# Patient Record
Sex: FEMALE | Race: WHITE | Employment: UNEMPLOYED | URBAN - METROPOLITAN AREA
[De-identification: names, ages, dates, MRNs, and addresses within clinical notes are randomized per-mention and may not be internally consistent; named-entity substitution may affect disease eponyms.]

---

## 2021-03-09 ENCOUNTER — HOSPITAL ENCOUNTER (EMERGENCY)
Age: 41
Discharge: HOME | End: 2021-03-09
Attending: EMERGENCY MEDICINE
Payer: COMMERCIAL

## 2021-03-09 VITALS
WEIGHT: 256.62 LBS | SYSTOLIC BLOOD PRESSURE: 132 MMHG | HEART RATE: 74 BPM | OXYGEN SATURATION: 100 % | RESPIRATION RATE: 16 BRPM | TEMPERATURE: 98.5 F | DIASTOLIC BLOOD PRESSURE: 72 MMHG

## 2021-03-09 DIAGNOSIS — G51.0 BELL'S PALSY: Primary | ICD-10-CM

## 2021-03-09 LAB
GLUCOSE BLD-MCNC: 179 MG/DL (ref 70–99)
PERFORMED ON: ABNORMAL

## 2021-03-09 PROCEDURE — 99283 EMERGENCY DEPT VISIT LOW MDM: CPT

## 2021-03-09 RX ORDER — PREDNISONE 20 MG/1
60 TABLET ORAL DAILY
Qty: 21 TABLET | Refills: 0 | Status: SHIPPED | OUTPATIENT
Start: 2021-03-09 | End: 2021-03-09 | Stop reason: SDUPTHER

## 2021-03-09 RX ORDER — VALACYCLOVIR HYDROCHLORIDE 1 G/1
1000 TABLET, FILM COATED ORAL 3 TIMES DAILY
Qty: 21 TABLET | Refills: 0 | Status: SHIPPED | OUTPATIENT
Start: 2021-03-09 | End: 2021-03-16

## 2021-03-09 RX ORDER — PREDNISONE 20 MG/1
60 TABLET ORAL DAILY
Qty: 21 TABLET | Refills: 0 | Status: SHIPPED | OUTPATIENT
Start: 2021-03-09 | End: 2021-03-16

## 2021-03-09 SDOH — HEALTH STABILITY: MENTAL HEALTH: HOW OFTEN DO YOU HAVE A DRINK CONTAINING ALCOHOL?: NEVER

## 2021-03-09 NOTE — ED PROVIDER NOTES
[address]       Pt Name: Ann-Marie Koch  MRN: [id number]  [place] 1980  Date of evaluation: 3/9/2021  Provider: Fonda Goldberg, PA    This patient was also seen and evaluated by the attending physician Jenna Bear, [address] [place] [address]       Chief Complaint   Patient presents with    Numbness     pt c/o right sided facial numbness started on 3/7       HISTORY OF PRESENT ILLNESS  (Location/Symptom, Timing/Onset, Context/Setting, Quality, Duration, Modifying Factors, Severity.)   Ann-Marie Koch is a 38 y.o. female who presents to the emergency department with complaint of numbness in the right side of the face and mild facial droop. Patient says her symptoms began the day before yesterday with some numbness in the right side of her tongue, and she was seen at [facility] yesterday where she got a work-up including CT scans for possible stroke. She says they found an aneurysm but nothing else that she is aware of, and she was discharged with prescriptions for Plavix and aspirin. She comes today because she says the numbness is worsening, and spreading through the right side of her face toward the ear, and she also has some loss of sense of taste. She says that today he began to notice some mild drooping on the right side of her mouth. Denies any prior history of symptoms like this or any history of stroke. Denies headache, cold symptoms, ear pain, hearing loss, neck pain or stiffness. Denies fever or feelings of general illness. Nursing Notes were reviewed and I agree. REVIEW OF SYSTEMS    (2-9 systems for level 4, 10 or more for level 5)     Constitutional:  Negative for fever, chills. Respiratory:  Negative for cough, shortness of breath. Cardiovascular:  Negative for chest pain, palpitations. Gastrointestinal:  Negative for nausea, vomiting, abdominal pain.    Genitourinary:  Negative for dysuria, hematuria, flank pain, and pelvic pain. Musculoskeletal:  Negative for myalgias, arthralgias, neck pain and neck stiffness. Neurological: Positive for numbness in the right-sided tongue and facial area, with some mild facial droop. Negative for dizziness, focal weakness, and headaches. Except as noted above the remainder of the review of systems was reviewed and negative. PAST MEDICAL HISTORY   History reviewed. No pertinent past medical history. SURGICAL HISTORY     History reviewed. No pertinent surgical history. CURRENT MEDICATIONS       Discharge Medication List as of 3/9/2021 12:51 PM          ALLERGIES     Patient has no known allergies. FAMILY HISTORY     History reviewed. No pertinent family history. No family status information on file. SOCIAL HISTORY      reports that she has never smoked. She has never used smokeless tobacco. She reports that she does not drink alcohol or use drugs. PHYSICAL EXAM    (up to 7 for level 4, 8 or more for level 5)     ED Triage Vitals   BP Temp Temp Source Pulse Resp SpO2 Height Weight   03/09/21 1018 03/09/21 1030 03/09/21 1030 03/09/21 1018 03/09/21 1018 03/09/21 1018 -- 03/09/21 1018   (!) 142/88 98.5 °F (36.9 °C) Oral 77 17 99 %  256 lb 9.9 oz (116.4 kg)       Constitutional:  Appearing well-developed and well-nourished. No distress. HENT:  Normocephalic and atraumatic. Cardiovascular:  Normal rate, regular rhythm, normal heart sounds and intact distal pulses. Pulmonary/Chest:  Effort normal and breath sounds normal. No respiratory distress. Musculoskeletal:  Normal range of motion. No edema exhibited. Neurological:  Alert and oriented to person, place, and time. PERRLA. EOM normal bilaterally. Very slight drooping noted at the right side of the mouth and in the right eyelid, with diminished muscle strength with smiling and eyebrow raising on the right side only. Sensation grossly intact. Finger to nose testing intact.  Negative pronator drift. 5/5 strength bilaterally in upper extremities with resisted shoulder abduction and flexion. Negative Romberg. Normal gait. Skin:  Skin is warm and dry. Not diaphoretic. Psychiatric:  Normal mood, affect, behavior, judgment and thought content. DIAGNOSTIC RESULTS     RADIOLOGY:   Non-plain film images such as CT, Ultrasound and MRI are read by the radiologist. Plain radiographic images are visualized and preliminarily interpreted by HARPREET Espinal with the below findings:    None. Interpretation per the Radiologist below, if available at the time of this note:    No orders to display       LABS:  Labs Reviewed   POCT GLUCOSE - Abnormal; Notable for the following components:       Result Value    POC Glucose 179 (*)     All other components within normal limits    Narrative:     Performed at:  [address] [address]   Phone [phone]       All other labs were within normal range or not returned as of this dictation. EMERGENCY DEPARTMENT COURSE and DIFFERENTIAL DIAGNOSIS/MDM:   Vitals:    Vitals:    03/09/21 1147 03/09/21 1202 03/09/21 1217 03/09/21 1232   BP: 109/68 (!) 134/96 130/70 132/72   Pulse: 85 69 65 74   Resp: 17 16 16 16   Temp:       TempSrc:       SpO2: 100% 100% 100% 100%   Weight:           The patient's condition in the ED was good, the patient was afebrile and nontoxic in appearance, and the patient's physical exam was strongly suggestive of Bell's palsy. Symptoms have been going on now for 2 days. No stroke alert indicated today. Review of records do show that work-up yesterday was negative for evidence of stroke, but did show a small cerebral aneurysm. I consulted the NP on-call for neurology, who said the patient could be seen for follow-up. No indication for repeat imaging or laboratory work-up today. There was no indication for hospitalization.   Patient will be discharged with prescriptions for a course of prednisone and valacyclovir, and she was given a follow-up for neurology at her previous visit. She will be advised to arrange for that follow-up but will be given a follow-up with our neurology service in case needed. She reports that she had already contacted neurosurgery subsequent to her ED visit yesterday, and they advised no further care at this time. The patient verbalized understanding and agreement with this plan of care. The patient was advised to return to the emergency department if symptoms should significantly worsen or if new and concerning symptoms should appear. I estimate there is LOW risk for SUBARACHNOID HEMORRHAGE, MENINGITIS, INTRACRANIAL HEMORRHAGE, SUBDURAL OR EPIDURAL HEMATOMA, OR STROKE, thus I consider the discharge disposition reasonable. PROCEDURES:  None    FINAL IMPRESSION      1.  Bell's palsy          DISPOSITION/PLAN   DISPOSITION Decision To Discharge 03/09/2021 12:42:03 PM      PATIENT REFERRED TO:  your neurologist    Schedule an appointment as soon as possible for a visit   For follow-up care    Jodeen Closs, MD  [address] [place] [address]  [phone]    Call   if needed, for neurology follow-up care      DISCHARGE MEDICATIONS:  Discharge Medication List as of 3/9/2021 12:51 PM      START taking these medications    Details   valACYclovir (VALTREX) 1 g tablet Take 1 tablet by mouth 3 times daily for 7 days, Disp-21 tablet, R-0Print             (Please note that portions of this note were completed with a voice recognition program.  Efforts were made to edit the dictations but occasionally words are mis-transcribed.)    Colleen Davis, [id number] [place] [address]  03/09/21 9749

## 2021-03-09 NOTE — ED PROVIDER NOTES
Attending Supervisory Note/Shared Visit   I have personally performed a face to face diagnostic evaluation on this patient. I have reviewed the mid-levels findings and agree. History and Exam by me shows a 74-year-old female who presents for evaluation of right facial numbness. Patient states that she was seen at [facility] yesterday, and at that time only had numbness of the right side of her tongue. She states that today the symptoms have progressed and she now has numbness over her cheek, as well as decreased taste on the right side of her tongue, and some mild weakness of the right side of her face. She reports that her right eye has been intermittently watering as well. She is had some mild fluctuating right-sided headache today which she reports is mild, 2 out of 10, not severe at onset. On exam she is well-appearing, afebrile, with normal vital signs. Noted to have a peripheral 7th nerve palsy, very subtle right facial droop, decreased sensation in the V2 V3 dermatomes, decreased movement of the right eyebrow compared to the left, and inability to maintain ocular closure on the right with resistance. Otherwise, completely normal neurologic exam.  Patient did reports that she had CT imaging done at [facility] yesterday, and they told her that she had an aneurysm. CT results from yesterday were reviewed, CT noncontrast of the head was negative. 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 Stroke consult was reviewed from yesterday as well, felt that the patient can follow-up for outpatient MRI, and in terms of the aneurysm, repeat CTA with neuro interventional clinic in 3 to 6 months to ensure stability. Per ER record, the ER physician at [place] [address] was had discussed with Dr. Savage Wetzel of neurosurgery who would follow-up with the patient regarding repeat CTA study.   Given full work-up obtained yesterday, and now exam more consistent with a peripheral 7th nerve palsy, did not feel that repeat laboratory studies or imaging were indicated. Repeat did discuss with neurology, who will follow the patient for further management of Bell's palsy. Neurology agreed that no additional work-up is indicated at this time. Patient will be treated with prednisone and valacyclovir. Recommended to return with any new or worsening symptoms including any other focal numbness or weakness. Patient is understanding, agreeable plan of care, discharged in stable condition.       Tiff Vieyra MD  Attending Emergency Physician        Tiff Vieyra MD  03/09/21 7153

## 2021-03-09 NOTE — ED TRIAGE NOTES
Pt arrived to the ED via private vehicle from home. Pt c/o left sided facial numbness that started on 3/7. Pt states she was told by her dr that if it got worse to come to the ED.  Pt denies pain

## 2021-03-09 NOTE — CONSULTS
Called by [facility] ED PA. In brief, the patient is a 39 yo female seen at [facility] yesterday for R tongue and face weakness. CT head was w/out any acute findings. CTA head/neck w/ small L ICA aneurysm to be followed outpatient by Neurosurgery.  stroke team was involved and started the patient on a short course of DAPT and felt that MRI brain could be pursued outpatient. The patient returned to [facility] ED today for progressive symptoms. She now had numbness on cheek and worsening R facial weakness. Exam by ED personnel was reportedly consistent w/ Bell's palsy which they planned on treating. The call was to arrange follow up w/ Neurology, which is fine. She can follow up w/ Dr. Mark Hunter in the office. Please call back w/ any additional questions or concerns. Thank you.       Florence Dos Santos NP  [address] [place] [address] [address] Neurology

## 2021-11-05 ENCOUNTER — HOSPITAL ENCOUNTER (EMERGENCY)
Age: 41
Discharge: HOME | End: 2021-11-05
Attending: EMERGENCY MEDICINE
Payer: COMMERCIAL

## 2021-11-05 VITALS
SYSTOLIC BLOOD PRESSURE: 133 MMHG | TEMPERATURE: 98 F | DIASTOLIC BLOOD PRESSURE: 76 MMHG | RESPIRATION RATE: 16 BRPM | WEIGHT: 256 LBS | OXYGEN SATURATION: 100 % | HEART RATE: 57 BPM

## 2021-11-05 DIAGNOSIS — Z86.69 HISTORY OF BELL'S PALSY: ICD-10-CM

## 2021-11-05 DIAGNOSIS — R29.810 FACIAL DROOP: Primary | ICD-10-CM

## 2021-11-05 PROCEDURE — 99283 EMERGENCY DEPT VISIT LOW MDM: CPT

## 2021-11-05 RX ORDER — MONTELUKAST SODIUM 10 MG/1
TABLET ORAL
COMMUNITY
Start: 2021-10-14

## 2021-11-05 RX ORDER — CETIRIZINE HYDROCHLORIDE 10 MG/1
TABLET ORAL
COMMUNITY
Start: 2021-10-13

## 2021-11-05 RX ORDER — HYDROXYZINE HYDROCHLORIDE 10 MG/1
TABLET, FILM COATED ORAL
COMMUNITY
Start: 2021-10-12

## 2021-11-05 RX ORDER — FLUOXETINE HYDROCHLORIDE 20 MG/1
CAPSULE ORAL
COMMUNITY
Start: 2021-11-01

## 2021-11-05 ASSESSMENT — PAIN DESCRIPTION - LOCATION: LOCATION: HEAD

## 2021-11-05 ASSESSMENT — PAIN DESCRIPTION - FREQUENCY: FREQUENCY: INTERMITTENT

## 2021-11-05 ASSESSMENT — ENCOUNTER SYMPTOMS
EYE DISCHARGE: 0
EYE REDNESS: 1
PHOTOPHOBIA: 0
RESPIRATORY NEGATIVE: 1

## 2021-11-05 ASSESSMENT — PAIN DESCRIPTION - PAIN TYPE: TYPE: ACUTE PAIN

## 2021-11-05 ASSESSMENT — PAIN DESCRIPTION - DESCRIPTORS: DESCRIPTORS: ACHING

## 2021-11-05 ASSESSMENT — PAIN SCALES - GENERAL: PAINLEVEL_OUTOF10: 2

## 2021-11-05 NOTE — ED PROVIDER NOTES
[address] [facility]      Pt Name: Zeeshan Gaitan  MRN: [id number]  [place] 1980  Date of evaluation: 11/5/2021  Provider: Keke Lyle MD    CHIEF COMPLAINT     When I woke up this morning I felt like the right side of my chin had a wrinkle in it in my right I was bigger and red and I was worried about a stroke though it felt sort of like when I had Bell's palsy. HISTORY OF PRESENT ILLNESS  (Location/Symptom, Timing/Onset,Context/Setting, Quality, Duration, Modifying Factors, Severity). Note limiting factors. Chief Complaint   Patient presents with    Facial Droop     Pt arrrives for eval of right side facial droop last known well 2100 yesterday, pt sts hx of bells palsy      Zeeshan Gaitan is a 42 y.o. female who presents to the emergency department secondary to concern for facial droop as noted above. On arrival here she no longer has a facial droop. She denies any other symptoms including numbness, tingling, paresthesias. She reports she had Bell's palsy previously and the symptoms took a while to develop. She states when she went to bed last night feeling normal this week she has been told by her boyfriend and daughter her right eye drooped and had an episode where it moved differently from the other eye. She has had no other trauma, no falls. She reports a history of an aneurysm behind her left eye which she follows with neurology for which has been stable. She states this week she has had a fever on and off, otherwise has felt fine. She was tested for Covid last week and her daughter was tested for Covid a couple of days ago both were negative. She has not been vaccinated against Covid. She denies any nausea vomiting any chest pain, belly pain. She does states she has a mild headache on the right side of her head and the back. No vision difficulties, no speech difficulties.     No past medical history noted below, she reports history of [place] Palsy and cerebral aneurysm, nonruptured. Denies smoking. Aside from what is stated above denies any other symptoms or modifying factors. Nursing Notes reviewed. REVIEW OF SYSTEMS  (2-9 systems for level 4, 10 or more for level 5)   Review of Systems   Constitutional: Negative for activity change. HENT: Negative for ear pain. Eyes: Positive for redness (Right eye, earlier, resolved). Negative for photophobia, discharge and visual disturbance. Respiratory: Negative. Cardiovascular: Negative. Genitourinary: Negative. Musculoskeletal: Negative for gait problem. Skin: Negative. Neurological: Positive for facial asymmetry (Earlier, resolved). Negative for syncope and weakness. PAST MEDICAL HISTORY     Past Medical History:   Diagnosis Date    Bell's palsy        SURGICALHISTORY       Past Surgical History:   Procedure Laterality Date    APPENDECTOMY      TUBAL LIGATION       CURRENT MEDICATIONS       Previous Medications    CETIRIZINE (ZYRTEC) 10 MG TABLET        FLUOXETINE (PROZAC) 20 MG CAPSULE        HYDROXYZINE (ATARAX) 10 MG TABLET        MONTELUKAST (SINGULAIR) 10 MG TABLET          ALLERGIES     Patient has no known allergies. FAMILY HISTORY     History reviewed. No pertinent family history.   SOCIAL HISTORY       Social History     Socioeconomic History    Marital status:      Spouse name: None    Number of children: None    Years of education: None    Highest education level: None   Occupational History    None   Tobacco Use    Smoking status: Never Smoker    Smokeless tobacco: Never Used   Vaping Use    Vaping Use: Never used   Substance and Sexual Activity    Alcohol use: Not Currently    Drug use: Never    Sexual activity: Not Currently   Other Topics Concern    None   Social History Narrative    None     Social Determinants of Health     Financial Resource Strain:     Difficulty of Paying Living Expenses:    Food Insecurity:  Worried About [facility] in the Last Year:    [address] in the Last Year:    Transportation Needs:     Lack of Transportation (Medical):  Lack of Transportation (Non-Medical):    Physical Activity:     Days of Exercise per Week:     Minutes of Exercise per Session:    Stress:     Feeling of Stress :    Social Connections:     Frequency of Communication with Friends and Family:     Frequency of Social Gatherings with Friends and Family:     Attends Buddhist Services:     Active Member of Clubs or Organizations:     Attends Club or Organization Meetings:     Marital Status:    Intimate Partner Violence:     Fear of Current or Ex-Partner:     Emotionally Abused:     Physically Abused:     Sexually Abused:      SCREENINGS   NIH Stroke Scale  Interval: Baseline  Level of Consciousness (1a. ): Alert  LOC Questions (1b. ): Answers both correctly  LOC Commands (1c. ): Performs both tasks correctly  Best Gaze (2. ): Normal  Visual (3. ): No visual loss  Facial Palsy (4. ): Normal symmetrical movement  Motor Arm, Left (5a. ): No drift  Motor Arm, Right (5b. ): No drift  Motor Leg, Left (6a. ): No drift  Motor Leg, Right (6b. ): No drift  Limb Ataxia (7. ): Absent  Sensory (8. ): Normal  Best Language (9. ): No aphasia  Dysarthria (10. ): Normal  Extinction and Inattention (11): No abnormality  Total: 0     PHYSICAL EXAM  (up to 7 for level 4, 8 or more for level 5)   INITIAL VITALS: BP: (!) 146/85, Temp: 98 °F (36.7 °C), Pulse: 59, Resp: 16, SpO2: 98 %   Physical Exam  Vitals reviewed. Constitutional:       Appearance: She is not toxic-appearing or diaphoretic. HENT:      Head: Normocephalic and atraumatic. Right Ear: External ear normal.      Left Ear: External ear normal.   Eyes:      General: No scleral icterus. Conjunctiva/sclera: Conjunctivae normal.   Neck:      Trachea: No tracheal deviation. Cardiovascular:      Rate and Rhythm: Normal rate.       Pulses: Normal pulses. Pulmonary:      Effort: Pulmonary effort is normal. No respiratory distress. Abdominal:      Tenderness: There is no abdominal tenderness. There is no guarding or rebound. Musculoskeletal:      Cervical back: Normal range of motion. No rigidity. Skin:     General: Skin is dry. Capillary Refill: Capillary refill takes less than 2 seconds. Neurological:      General: No focal deficit present. Mental Status: She is alert and oriented to person, place, and time. Cranial Nerves: No cranial nerve deficit. Sensory: No sensory deficit. Motor: No weakness. Coordination: Coordination normal. Finger-Nose-Finger Test normal.      Gait: Gait normal.      Comments: Face is grossly symmetric. She is able to puff out her lips without any change. Tongue is midline, uvula is midline. Eyes closed symmetrically. Forehead wrinkle symmetrically. Strength and sensation intact bilateral upper and lower extremities. DIAGNOSTIC RESULTS     RADIOLOGY:   Interpretation per Radiologist below, if available at the time of this note:  No orders to display     LABS:  Labs Reviewed - No data to display    [address] [place] [address] and DIFFERENTIAL DIAGNOSIS/MDM:   Patient was given the following medications:  No orders of the defined types were placed in this encounter. CONSULTS:  IP CONSULT TO NEUROLOGY    INITIAL VITALS: BP: (!) 146/85, Temp: 98 °F (36.7 °C), Pulse: 59, Resp: 16, SpO2: 98 %   RECENT VITALS:  BP: 133/76,Temp: 98 °F (36.7 °C), Pulse: 57, Resp: 16, SpO2: 100 %     Alize Liu is a 42 y.o. female who presents to the emergency department secondary to concern for symptoms as noted in HPI. On arrival she is awake, alert, oriented. There is no significant facial droop noted.   I walked him to the restroom so we could look together in the mirror and she did report she did not feel that there was anything different however she still felt like her skin around her chin and neck felt a little bit different. NIH score 0. Test of skew negative. No truncal ataxia. We had a really long discussion regarding her history of Bell's palsy, recent febrile illness, and potential for recurrence of Bell's palsy. She does not particularly want to stay for further testing of stroke given her symptoms are most likely consistent with recurrence of Bell's palsy. Since she does follow with neurology a consult was placed to Dr. Sb Rush, [place] [phone], who patient follows with outpatient. Unfortunately there was difficulty getting a hold of her neurologist and our neurology team was also consulted. I ended up speaking with both neurology teams both who are in agreement patient is most likely having recrudescence of her Bell's palsy given her recent febrile illness. Did discuss how the only way to truly rule out stroke is with MRI though the concern for this is very low from both teams as well as from myself. After further discussion with the patient given she is feeling at her baseline and after discussion of recrudescence of Bell's palsy when she is ill and since she has been with fever on and off this week she prefers to follow-up with her primary care outpatient. She did not want to get retested for Covid. She did express understanding of the strict return precautions which we discussed at length. We also discussed getting her Covid vaccine which she is planning to do but was told by her allergist to wait given concern for potential anaphylaxis. I went over the different risk benefits with her as well and encouraged her to continue talking about it with her primary care and allergist in order to get scheduled to get it as soon as it is safely possible. FINAL IMPRESSION      1. Facial droop    2.  History of Bell's palsy        DISPOSITION/PLAN   DISPOSITION Decision To Discharge 11/05/2021 10:10:19 AM      PATIENT REFERRED TO:  Claudine Meneses  [address] [facility] [place] [address]  [phone]    Schedule an appointment as soon as possible for a visit   For follow up appointment, As needed      DISCHARGE MEDICATIONS:  New Prescriptions    No medications on file            (Please note that portions of this note were completed with a voice recognition program. Efforts were made to edit the dictations but occasionally words are mis-transcribed.)    Christina Winkler MD (electronically signed)  Attending Emergency Physician        Christina Winkler MD  11/05/21 1014

## 2021-12-06 ENCOUNTER — HOSPITAL ENCOUNTER (EMERGENCY)
Age: 41
Discharge: HOME | End: 2021-12-06
Attending: EMERGENCY MEDICINE
Payer: COMMERCIAL

## 2021-12-06 ENCOUNTER — APPOINTMENT (OUTPATIENT)
Dept: GENERAL RADIOLOGY | Age: 41
End: 2021-12-06
Payer: COMMERCIAL

## 2021-12-06 VITALS
RESPIRATION RATE: 15 BRPM | SYSTOLIC BLOOD PRESSURE: 112 MMHG | DIASTOLIC BLOOD PRESSURE: 63 MMHG | HEART RATE: 57 BPM | OXYGEN SATURATION: 99 % | TEMPERATURE: 98 F

## 2021-12-06 DIAGNOSIS — R07.89 CHEST WALL PAIN: Primary | ICD-10-CM

## 2021-12-06 LAB
ANION GAP SERPL CALCULATED.3IONS-SCNC: 12 MMOL/L (ref 3–16)
BASOPHILS ABSOLUTE: 0.1 K/UL (ref 0–0.2)
BASOPHILS RELATIVE PERCENT: 1 %
BUN BLDV-MCNC: 11 MG/DL (ref 7–20)
CALCIUM SERPL-MCNC: 8.8 MG/DL (ref 8.3–10.6)
CHLORIDE BLD-SCNC: 104 MMOL/L (ref 99–110)
CO2: 22 MMOL/L (ref 21–32)
CREAT SERPL-MCNC: 0.6 MG/DL (ref 0.6–1.1)
D DIMER: 205 NG/ML DDU (ref 0–229)
EKG ATRIAL RATE: 63 BPM
EKG DIAGNOSIS: NORMAL
EKG P AXIS: 24 DEGREES
EKG P-R INTERVAL: 148 MS
EKG Q-T INTERVAL: 454 MS
EKG QRS DURATION: 78 MS
EKG QTC CALCULATION (BAZETT): 464 MS
EKG R AXIS: 42 DEGREES
EKG T AXIS: 23 DEGREES
EKG VENTRICULAR RATE: 63 BPM
EOSINOPHILS ABSOLUTE: 0.1 K/UL (ref 0–0.6)
EOSINOPHILS RELATIVE PERCENT: 2.4 %
GFR AFRICAN AMERICAN: >60
GFR NON-AFRICAN AMERICAN: >60
GLUCOSE BLD-MCNC: 115 MG/DL (ref 70–99)
HCG QUALITATIVE: NEGATIVE
HCT VFR BLD CALC: 38.6 % (ref 36–48)
HEMOGLOBIN: 12.9 G/DL (ref 12–16)
LYMPHOCYTES ABSOLUTE: 1.9 K/UL (ref 1–5.1)
LYMPHOCYTES RELATIVE PERCENT: 32.3 %
MCH RBC QN AUTO: 30.9 PG (ref 26–34)
MCHC RBC AUTO-ENTMCNC: 33.5 G/DL (ref 31–36)
MCV RBC AUTO: 92.2 FL (ref 80–100)
MONOCYTES ABSOLUTE: 0.6 K/UL (ref 0–1.3)
MONOCYTES RELATIVE PERCENT: 9.8 %
NEUTROPHILS ABSOLUTE: 3.2 K/UL (ref 1.7–7.7)
NEUTROPHILS RELATIVE PERCENT: 54.5 %
PDW BLD-RTO: 13.2 % (ref 12.4–15.4)
PLATELET # BLD: 307 K/UL (ref 135–450)
PMV BLD AUTO: 8.6 FL (ref 5–10.5)
POTASSIUM SERPL-SCNC: 3.8 MMOL/L (ref 3.5–5.1)
RBC # BLD: 4.19 M/UL (ref 4–5.2)
SODIUM BLD-SCNC: 138 MMOL/L (ref 136–145)
WBC # BLD: 5.9 K/UL (ref 4–11)

## 2021-12-06 PROCEDURE — 85025 COMPLETE CBC W/AUTO DIFF WBC: CPT

## 2021-12-06 PROCEDURE — 93010 ELECTROCARDIOGRAM REPORT: CPT | Performed by: INTERNAL MEDICINE

## 2021-12-06 PROCEDURE — 96374 THER/PROPH/DIAG INJ IV PUSH: CPT

## 2021-12-06 PROCEDURE — 93005 ELECTROCARDIOGRAM TRACING: CPT | Performed by: EMERGENCY MEDICINE

## 2021-12-06 PROCEDURE — 71046 X-RAY EXAM CHEST 2 VIEWS: CPT

## 2021-12-06 PROCEDURE — 85379 FIBRIN DEGRADATION QUANT: CPT

## 2021-12-06 PROCEDURE — 6360000002 HC RX W HCPCS: Performed by: EMERGENCY MEDICINE

## 2021-12-06 PROCEDURE — 84703 CHORIONIC GONADOTROPIN ASSAY: CPT

## 2021-12-06 PROCEDURE — 99283 EMERGENCY DEPT VISIT LOW MDM: CPT

## 2021-12-06 PROCEDURE — 80048 BASIC METABOLIC PNL TOTAL CA: CPT

## 2021-12-06 PROCEDURE — 6370000000 HC RX 637 (ALT 250 FOR IP): Performed by: EMERGENCY MEDICINE

## 2021-12-06 PROCEDURE — 36415 COLL VENOUS BLD VENIPUNCTURE: CPT

## 2021-12-06 RX ORDER — ACETAMINOPHEN 325 MG/1
650 TABLET ORAL ONCE
Status: COMPLETED | OUTPATIENT
Start: 2021-12-06 | End: 2021-12-06

## 2021-12-06 RX ORDER — KETOROLAC TROMETHAMINE 30 MG/ML
15 INJECTION, SOLUTION INTRAMUSCULAR; INTRAVENOUS ONCE
Status: COMPLETED | OUTPATIENT
Start: 2021-12-06 | End: 2021-12-06

## 2021-12-06 RX ORDER — LIDOCAINE 50 MG/G
1 PATCH TOPICAL DAILY
Qty: 10 PATCH | Refills: 0 | Status: SHIPPED | OUTPATIENT
Start: 2021-12-06 | End: 2021-12-16

## 2021-12-06 RX ORDER — LIDOCAINE 4 G/G
1 PATCH TOPICAL ONCE
Status: DISCONTINUED | OUTPATIENT
Start: 2021-12-06 | End: 2021-12-06 | Stop reason: HOSPADM

## 2021-12-06 RX ADMIN — ACETAMINOPHEN 650 MG: 325 TABLET ORAL at 05:19

## 2021-12-06 RX ADMIN — KETOROLAC TROMETHAMINE 15 MG: 30 INJECTION, SOLUTION INTRAMUSCULAR; INTRAVENOUS at 05:18

## 2021-12-06 ASSESSMENT — PAIN DESCRIPTION - ORIENTATION: ORIENTATION: LEFT

## 2021-12-06 ASSESSMENT — PAIN DESCRIPTION - DESCRIPTORS: DESCRIPTORS: STABBING;SHARP

## 2021-12-06 ASSESSMENT — PAIN DESCRIPTION - PAIN TYPE: TYPE: ACUTE PAIN

## 2021-12-06 ASSESSMENT — PAIN DESCRIPTION - LOCATION: LOCATION: CHEST

## 2021-12-06 ASSESSMENT — PAIN SCALES - GENERAL
PAINLEVEL_OUTOF10: 7
PAINLEVEL_OUTOF10: 3

## 2021-12-06 ASSESSMENT — PAIN DESCRIPTION - FREQUENCY: FREQUENCY: INTERMITTENT

## 2021-12-06 ASSESSMENT — PAIN DESCRIPTION - PROGRESSION: CLINICAL_PROGRESSION: NOT CHANGED

## 2021-12-06 ASSESSMENT — PAIN DESCRIPTION - ONSET: ONSET: SUDDEN

## 2021-12-06 NOTE — Clinical Note
Lico Boyd was seen and treated in our emergency department on 12/6/2021. She may return to work on 12/07/2021. Olivia Corral was present in emergency department with patient     If you have any questions or concerns, please don't hesitate to call.       Marcelino Davidson MD

## 2021-12-06 NOTE — ED PROVIDER NOTES
[address] [facility]      Pt Name: Alma Kendall  MRN: [id number]  Lazarogfsamm 1980  Date of evaluation: 12/6/2021  Provider: Sussy Barcenas MD    CHIEF COMPLAINT     I woke up around 130 to give my daughter some water. When I went to lay back down in bed I noticed to had pain on the left side of my chest underneath my breast.  It hurts more when I take a deep breath. It was not getting better so I decided to come in. HISTORY OF PRESENT ILLNESS  (Location/Symptom, Timing/Onset,Context/Setting, Quality, Duration, Modifying Factors, Severity). Note limiting factors. Chief Complaint   Patient presents with    Chest Pain     pt with chest pain that started @0140. Pt woke up to get someting for her daughter, when she layed back down she started having pain under her left breat that radiates to her shoulder. Pain is sharp in nature an occurs with deep breaths      Alma Kendall is a 42 y.o. female who presents to the emergency department secondary to concern for pain in her chest as noted above. Denies any nausea, vomiting, shortness of breath. No syncope. No known trauma or falls. Denies any new activities. She has not recently been on a long trip, no recent surgeries. No family history of blood clots, no known self history of blood clots or bleeding disorders. Did not take any pain medication prior to coming in. Pain feels sharp, rates it a 7 out of 10, rates it is worse with deep breaths. Past medical history noted below, significant for Bell's palsy. Also reports a history of urticaria. Denies smoking. Aside from what is stated above denies any other symptoms or modifying factors. Nursing Notes reviewed. REVIEW OF SYSTEMS  (2-9 systems for level 4, 10 or more for level 5)   Review of Systems  Pertinent positive and negative findings as documented in the HPI; otherwise all other systems were reviewed and were negative. PAST MEDICAL HISTORY     Past Medical History:   Diagnosis Date    Bell's palsy        SURGICALHISTORY       Past Surgical History:   Procedure Laterality Date    APPENDECTOMY      TUBAL LIGATION       CURRENT MEDICATIONS       Previous Medications    CETIRIZINE (ZYRTEC) 10 MG TABLET        FLUOXETINE (PROZAC) 20 MG CAPSULE        HYDROXYZINE (ATARAX) 10 MG TABLET        MONTELUKAST (SINGULAIR) 10 MG TABLET          ALLERGIES     Patient has no known allergies. FAMILY HISTORY     History reviewed. No pertinent family history. SOCIAL HISTORY       Social History     Socioeconomic History    Marital status:      Spouse name: None    Number of children: None    Years of education: None    Highest education level: None   Occupational History    None   Tobacco Use    Smoking status: Never Smoker    Smokeless tobacco: Never Used   Vaping Use    Vaping Use: Never used   Substance and Sexual Activity    Alcohol use: Not Currently    Drug use: Never    Sexual activity: Not Currently   Other Topics Concern    None   Social History Narrative    None     Social Determinants of Health     Financial Resource Strain:     Difficulty of Paying Living Expenses: Not on file   Food Insecurity:     Worried About Running Out of Food in the Last Year: Not on file    Jose Miguel of Food in the Last Year: Not on file   Transportation Needs:     Lack of Transportation (Medical): Not on file    Lack of Transportation (Non-Medical):  Not on file   Physical Activity:     Days of Exercise per Week: Not on file    Minutes of Exercise per Session: Not on file   Stress:     Feeling of Stress : Not on file   Social Connections:     Frequency of Communication with Friends and Family: Not on file    Frequency of Social Gatherings with Friends and Family: Not on file    Attends Sabianism Services: Not on file    Active Member of Clubs or Organizations: Not on file    Attends Club or Organization Meetings: Not on file  Marital Status: Not on file   Intimate Partner Violence:     Fear of Current or Ex-Partner: Not on file    Emotionally Abused: Not on file    Physically Abused: Not on file    Sexually Abused: Not on file   Housing Stability:     Unable to Pay for Housing in the Last Year: Not on file    Number of [place] in the Last Year: Not on file    Unstable Housing in the Last Year: Not on file     SCREENINGS         PHYSICAL EXAM  (up to 7 for level 4, 8 or more for level 5)   INITIAL VITALS: BP: 117/82, Temp: 98 °F (36.7 °C), Pulse: 57, Resp: 16, SpO2: 97 %   Physical Exam  Vitals reviewed. Constitutional:       General: She is not in acute distress. Appearance: She is obese. She is not ill-appearing, toxic-appearing or diaphoretic. HENT:      Head: Normocephalic and atraumatic. Right Ear: External ear normal.      Left Ear: External ear normal.      Nose: Nose normal.   Eyes:      General: No scleral icterus. Right eye: No discharge. Left eye: No discharge. Conjunctiva/sclera: Conjunctivae normal.      Pupils: Pupils are equal, round, and reactive to light. Neck:      Trachea: No tracheal deviation. Cardiovascular:      Rate and Rhythm: Normal rate. Pulmonary:      Effort: Pulmonary effort is normal. No respiratory distress. Chest:      Chest wall: No tenderness. Comments: No overlying skin changes, no crepitus  Abdominal:      Tenderness: There is no abdominal tenderness. There is no guarding or rebound. Musculoskeletal:      Right lower leg: No edema. Left lower leg: No edema. Skin:     General: Skin is dry. Capillary Refill: Capillary refill takes less than 2 seconds. Neurological:      General: No focal deficit present. Mental Status: She is alert and oriented to person, place, and time.    Psychiatric:         Mood and Affect: Mood normal.         Behavior: Behavior normal.       DIAGNOSTIC RESULTS   EKG: interpreted by the Emergency Department Physician who either signs or Co-signs this chart in the absence of a cardiologist.  Indication: Chest pain  Interpretation: Rate 63, rhythm sinus, axis normal.  [place]/QRS/QTc within normal limits. No T/ST changes consistent with acute ischemia. No prior EKG available for comparison. RADIOLOGY:   Interpretation per Radiologist below, if available at the time of this note:  XR CHEST (2 VW)   Final Result   No significant findings in the chest.           LABS:  Labs Reviewed   BASIC METABOLIC PANEL - Abnormal; Notable for the following components:       Result Value    Glucose 115 (*)     All other components within normal limits    Narrative:     Performed at:  [facility]  [address] [address]   Phone [phone]   CBC WITH AUTO DIFFERENTIAL    Narrative:     Performed at:  [address] [address]   Phone [phone]   HCG, SERUM, QUALITATIVE    Narrative:     Performed at:  [facility]  [address] [address]   Phone [phone]   D-DIMER, QUANTITATIVE    Narrative:     Performed at:  [facility]  [address] [address]   Phone [phone] [address] [facility] and DIFFERENTIAL DIAGNOSIS/MDM:   Patient was given the following medications:  Orders Placed This Encounter   Medications    ketorolac (TORADOL) injection 15 mg    acetaminophen (TYLENOL) tablet 650 mg    lidocaine 4 % external patch 1 patch    lidocaine (LIDODERM) 5 %     Sig: Place 1 patch onto the skin daily for 10 days 12 hours on, 12 hours off.      Dispense:  10 patch     Refill:  0     CONSULTS:  None    INITIAL VITALS: BP: 117/82, Temp: 98 °F (36.7 °C), Pulse: 57, Resp: 16, SpO2: 97 %   RECENT VITALS:  BP: 112/63,Temp: 98 °F (36.7 °C), Pulse: 57, Resp: 15, SpO2: 99 %     Albina napoles a 42 y.o. female who presents to the emergency department secondary to concern for symptoms as noted in HPI. On arrival she is awake, alert, oriented. Vitals are hemodynamically stable. Physical exam is reassuring as noted above with no overlying skin changes, no crepitus, no tenderness palpation. Pain is worse when she does take a deep breath. A peripheral IV was placed, labs were ordered along with EKG, chest x-ray. Discussed concern for potential PE and ordering a D-dimer. My concern for ACS is very low given her lack of risk factors, history, vitals. EKG with no acute ischemic changes, no prior available for comparison. Chest XR clear. Labs reassuring including a negative D-dimer. On reassessment she reported the pain was still there but not as sharp. Repeat vitals at that time remained hemodynamically stable. We discussed results of her imaging and labs. Discussed follow-up with primary care. Discussed return precautions. She and her  both expressed understanding of all instructions including follow-up and return precautions and she was discharged home in stable condition. FINAL IMPRESSION      1. Chest wall pain        DISPOSITION/PLAN   DISPOSITION Decision To Discharge 12/06/2021 05:36:30 AM      PATIENT REFERRED TO:  Derrick Enciso  [address] [place] [address]  [phone]    Schedule an appointment as soon as possible for a visit   For follow up appointment, As needed      DISCHARGE MEDICATIONS:  New Prescriptions    LIDOCAINE (LIDODERM) 5 %    Place 1 patch onto the skin daily for 10 days 12 hours on, 12 hours off.             (Please note that portions of this note were completed with a voice recognition program. Efforts were made to edit the dictations but occasionally words are mis-transcribed.)    Carlos Ocampo MD (electronically signed)  Attending Emergency Physician      Carlos Ocampo MD  12/06/21 [id number]

## 2021-12-06 NOTE — ED NOTES
D/C: Order noted for d/c. Pt confirmed d/c paperwork and RX have correct name. Discharge and education instructions reviewed with patient. Teach-back successful. Pt verbalized understanding and signed d/c papers. Pt denied questions at this time. No acute distress noted. Patient instructed to follow-up as noted - return to emergency department if symptoms worsen. Patient verbalized understanding. Discharged per EDMD with discharge instructions. Pt discharged to private vehicle. Patient stable upon departure. Thanked patient for choosing [facility] for care. Provider aware of patient pain at time of discharge.      Nikolai Cantu RN  12/06/21 1132